# Patient Record
Sex: FEMALE | Race: BLACK OR AFRICAN AMERICAN
[De-identification: names, ages, dates, MRNs, and addresses within clinical notes are randomized per-mention and may not be internally consistent; named-entity substitution may affect disease eponyms.]

---

## 2019-03-25 ENCOUNTER — HOSPITAL ENCOUNTER (EMERGENCY)
Dept: HOSPITAL 61 - ER | Age: 64
Discharge: HOME | End: 2019-03-25
Payer: COMMERCIAL

## 2019-03-25 VITALS — WEIGHT: 135 LBS | HEIGHT: 62 IN | BODY MASS INDEX: 24.84 KG/M2

## 2019-03-25 VITALS — DIASTOLIC BLOOD PRESSURE: 72 MMHG | SYSTOLIC BLOOD PRESSURE: 152 MMHG

## 2019-03-25 DIAGNOSIS — B37.2: ICD-10-CM

## 2019-03-25 DIAGNOSIS — L24.3: Primary | ICD-10-CM

## 2019-03-25 DIAGNOSIS — L08.89: ICD-10-CM

## 2019-03-25 PROCEDURE — 90715 TDAP VACCINE 7 YRS/> IM: CPT

## 2019-03-25 PROCEDURE — 99283 EMERGENCY DEPT VISIT LOW MDM: CPT

## 2019-03-25 PROCEDURE — 90471 IMMUNIZATION ADMIN: CPT

## 2019-03-25 NOTE — PHYS DOC
Past Medical History


Past Medical History:  Other


Additional Past Medical Histor:  breast cancer


Past Surgical History:  Hysterectomy


Additional Past Surgical Histo:  gallstone surgery, L mastectomy


Alcohol Use:  None


Drug Use:  None





Adult General


Chief Complaint


Chief Complaint:  HAND PROBLEM





HPI


HPI





Patient is a 64  year old female who presents complaining of infection to the 

hands. 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 

does not wear gloves. Part of her job involves application of perming agents to 

hair. Patient states she's had this infection for a couple weeks. She states 

[facility] and was given prednisone which helped 

slightly but the infection did not go away, she states last week she noted some 

pus coming from some of her fingers. Patient denies any fever.





Review of Systems


Review of Systems





Constitutional: Denies fever or chills []


Musculoskeletal: Denies back pain or joint pain []


Integument: Reports finger infection


Neurologic: Denies headache, focal weakness or sensory changes []








All other systems were reviewed and found to be within normal limits, except as 

documented in this note.





Current Medications


Current Medications





Current Medications








 Medications


  (Trade)  Dose


 Ordered  Sig/Darlin  Start Time


 Stop Time Status Last Admin


Dose Admin


 


 Diphtheria/


 Tetanus/Acell


 Pertussis


  (Boostrix)  0.5 ml  ONCE ONCE  3/25/19 09:00


 3/25/19 09:01 DC 3/25/19 09:09


0.5 ML











Allergies


Allergies





Allergies








Coded Allergies Type Severity Reaction Last Updated Verified


 


  No Known Drug Allergies    3/25/19 No











Physical Exam


Physical Exam





Constitutional: Well developed, well nourished, no acute distress, non-toxic 

appearance. []





Skin: Ventral aspect of the left hand second, fourth, fifth fingers with 

diffuse crusty erythematous rash, this appears to be a fungal infection that 

has turned into bacterial. Similar lesions noted on the right pinky finger, 

index finger. Neurovascular exam is intact, no pus drainage noted on physical 

exam.


Back: No tenderness, no CVA tenderness. [] 


Extremities: No tenderness, no cyanosis, no clubbing, ROM intact, no edema. [] 


Neurologic: Alert and oriented X 3, normal motor function, normal sensory 

function, no focal deficits noted. []


Psychologic: Affect normal, judgement normal, mood normal. []





Current Patient Data


Vital Signs





 Vital Signs








  Date Time  Temp Pulse Resp B/P (MAP) Pulse Ox O2 Delivery O2 Flow Rate FiO2


 


3/25/19 08:35 98.0 96 15 152/72 (98) 98 Room Air  





 98.0       











EKG


EKG


[]





Radiology/Procedures


Radiology/Procedures


[]





Course & Med Decision Making


Course & Med Decision Making


Pertinent Labs and Imaging studies reviewed. (See chart for details)





This is a 64-year-old female patient who works as a hairdresser in a -

[employer] who presents today with what appears to have been  contact 

dermatitis from skin care products that turned into yeast infection and sone 

fingers have bacterial infection. Patient was advised to make sure she wears 

gloves all the time at work. She is given a tetanus injection and discharged 

with Bactrim, cephalexin/nystatin cream. Follow-up with dermatologist in 2-4 

weeks.





Dragon Disclaimer


Dragon Disclaimer


This electronic medical record was generated, in whole or in part, using a 

voice recognition dictation system.





Departure


Departure


Impression:  


 Primary Impression:  


 Infection of skin of finger


 Additional Impressions:  


 Contact dermatitis


 Candidiasis, cutaneous


Disposition:  01 HOME, SELF-CARE


Condition:  STABLE


Referrals:  


UNKNOWN PCP NAME (PCP)








ELOISE SALDANA MD


follow up in one week


Patient Instructions:  Cutaneous Candidiasis, Skin Infections





Additional Instructions:  


You were seen in the emergency room for skin infection on your fingers. We 

highly recommend you make sure you wear gloves at all times at work. Take the 

prescribed medications as ordered. Follow-up with your own doctor or the 

provided dermatologist in 2-4 weeks.


Scripts


Nystatin/Triamcin (NYSTATIN-TRIAMCINOLONE CREAM) 15 Gm Cream..g.


1 MALIK TP BID, #60 GM 1 Refill


   Prov: THAIS YEPEZ         3/25/19 


Sulfamethoxazole/Trimethoprim (BACTRIM DS TABLET) 1 Each Tablet


1 TAB PO BID, #20 TAB


   Prov: THAIS YEPEZ         3/25/19





Problem Qualifiers








 Additional Impressions:  


 Contact dermatitis


 Contact dermatitis type:  irritant  Contact dermatitis trigger:  cosmetics  

Qualified Codes:  L24.3 - Irritant contact dermatitis due to cosmetics








THAIS YEPEZ Mar 25, 2019 09:15